# Patient Record
Sex: MALE | Race: WHITE | URBAN - METROPOLITAN AREA
[De-identification: names, ages, dates, MRNs, and addresses within clinical notes are randomized per-mention and may not be internally consistent; named-entity substitution may affect disease eponyms.]

---

## 2022-01-21 ENCOUNTER — OFFICE VISIT (OUTPATIENT)
Dept: URBAN - METROPOLITAN AREA CLINIC 98 | Facility: CLINIC | Age: 32
End: 2022-01-21

## 2022-01-31 ENCOUNTER — WEB ENCOUNTER (OUTPATIENT)
Dept: URBAN - METROPOLITAN AREA CLINIC 98 | Facility: CLINIC | Age: 32
End: 2022-01-31

## 2022-01-31 ENCOUNTER — LAB OUTSIDE AN ENCOUNTER (OUTPATIENT)
Dept: URBAN - METROPOLITAN AREA CLINIC 98 | Facility: CLINIC | Age: 32
End: 2022-01-31

## 2022-01-31 ENCOUNTER — OFFICE VISIT (OUTPATIENT)
Dept: URBAN - METROPOLITAN AREA CLINIC 98 | Facility: CLINIC | Age: 32
End: 2022-01-31

## 2022-01-31 VITALS
HEART RATE: 51 BPM | SYSTOLIC BLOOD PRESSURE: 114 MMHG | BODY MASS INDEX: 25.34 KG/M2 | DIASTOLIC BLOOD PRESSURE: 83 MMHG | WEIGHT: 167.2 LBS | HEIGHT: 68 IN | TEMPERATURE: 97.3 F

## 2022-01-31 DIAGNOSIS — K62.5 RECTAL BLEEDING: ICD-10-CM

## 2022-01-31 DIAGNOSIS — Z12.11 COLON CANCER SCREENING: ICD-10-CM

## 2022-01-31 DIAGNOSIS — R10.9 ABDOMINAL PAIN: ICD-10-CM

## 2022-01-31 DIAGNOSIS — K20.0 EOSINOPHILIC ESOPHAGITIS: ICD-10-CM

## 2022-01-31 DIAGNOSIS — R10.11 RUQ PAIN: ICD-10-CM

## 2022-01-31 PROCEDURE — G9903 PT SCRN TBCO ID AS NON USER: HCPCS | Performed by: INTERNAL MEDICINE

## 2022-01-31 PROCEDURE — G8427 DOCREV CUR MEDS BY ELIG CLIN: HCPCS | Performed by: INTERNAL MEDICINE

## 2022-01-31 PROCEDURE — 99214 OFFICE O/P EST MOD 30 MIN: CPT | Performed by: INTERNAL MEDICINE

## 2022-01-31 PROCEDURE — G8482 FLU IMMUNIZE ORDER/ADMIN: HCPCS | Performed by: INTERNAL MEDICINE

## 2022-01-31 PROCEDURE — 3017F COLORECTAL CA SCREEN DOC REV: CPT | Performed by: INTERNAL MEDICINE

## 2022-01-31 PROCEDURE — G8420 CALC BMI NORM PARAMETERS: HCPCS | Performed by: INTERNAL MEDICINE

## 2022-01-31 NOTE — HPI-TODAY'S VISIT:
f/u visit: Previously worked up with [facility].  Reviewed prior records: HIDA WNL RUQ US unremarkable 2019 EGD/Colon unremarkable 2016 EGD with esophagitis and suggested eosinophilic esophagitis Seeing blood in stools from time to time.  Mostly on TP, but sometimes in stool Known hemorrhoids and can have some rectal irritation His BMs are fairly regular Can strain with some bowel movemetns in the afternoon Stools can be hard to well formed to diarrhea Long h/o RUQ pain Intermittent pain, worse at end of day Radiates to his shoulder Has not responded to Omeprazole, and does not like the medication      PRIOR VISIT 2019: has noted BRB in stool looser stools abd pain, LUQ skin rashes, lichen planus treated for GERD in past took ulcer meds pain no better s/p EGD and colonoscopy s/p abd ultrasound

## 2022-03-27 LAB
A/G RATIO: 1.7
ALBUMIN: 4.7
ALKALINE PHOSPHATASE: 63
ALT (SGPT): 20
AST (SGOT): 19
BASO (ABSOLUTE): 0.1
BASOS: 1
BILIRUBIN, TOTAL: 0.5
BUN/CREATININE RATIO: 17
BUN: 18
C-REACTIVE PROTEIN, QUANT: <1
CALCIUM: 9.9
CARBON DIOXIDE, TOTAL: 21
CHLORIDE: 100
CREATININE: 1.09
EGFR: 93
ENDOMYSIAL ANTIBODY IGA: NEGATIVE
EOS (ABSOLUTE): 0.3
EOS: 6
GLOBULIN, TOTAL: 2.7
GLUCOSE: 113
HEMATOCRIT: 48.1
HEMATOLOGY COMMENTS:: (no result)
HEMOGLOBIN: 15.6
IMMATURE CELLS: (no result)
IMMATURE GRANS (ABS): 0
IMMATURE GRANULOCYTES: 0
IMMUNOGLOBULIN A, QN, SERUM: 269
LYMPHS (ABSOLUTE): 1.5
LYMPHS: 35
MCH: 29.5
MCHC: 32.4
MCV: 91
MONOCYTES(ABSOLUTE): 0.5
MONOCYTES: 12
NEUTROPHILS (ABSOLUTE): 2
NEUTROPHILS: 46
NRBC: (no result)
PLATELETS: 220
POTASSIUM: 4.6
PROTEIN, TOTAL: 7.4
RBC: 5.29
RDW: 12
SODIUM: 137
T-TRANSGLUTAMINASE (TTG) IGA: <2
T4,FREE(DIRECT): 1.21
TSH: 1.19
WBC: 4.3

## 2022-03-28 ENCOUNTER — OFFICE VISIT (OUTPATIENT)
Dept: URBAN - METROPOLITAN AREA CLINIC 98 | Facility: CLINIC | Age: 32
End: 2022-03-28

## 2022-03-28 ENCOUNTER — WEB ENCOUNTER (OUTPATIENT)
Dept: URBAN - METROPOLITAN AREA CLINIC 98 | Facility: CLINIC | Age: 32
End: 2022-03-28

## 2022-03-28 VITALS
BODY MASS INDEX: 25.01 KG/M2 | DIASTOLIC BLOOD PRESSURE: 84 MMHG | HEIGHT: 68 IN | SYSTOLIC BLOOD PRESSURE: 132 MMHG | WEIGHT: 165 LBS | TEMPERATURE: 96.6 F | HEART RATE: 63 BPM

## 2022-03-28 DIAGNOSIS — Z12.11 COLON CANCER SCREENING: ICD-10-CM

## 2022-03-28 DIAGNOSIS — R10.9 ABDOMINAL PAIN: ICD-10-CM

## 2022-03-28 DIAGNOSIS — K62.5 RECTAL BLEEDING: ICD-10-CM

## 2022-03-28 DIAGNOSIS — K20.0 EOSINOPHILIC ESOPHAGITIS: ICD-10-CM

## 2022-03-28 DIAGNOSIS — R10.11 RUQ PAIN: ICD-10-CM

## 2022-03-28 PROCEDURE — G8482 FLU IMMUNIZE ORDER/ADMIN: HCPCS | Performed by: INTERNAL MEDICINE

## 2022-03-28 PROCEDURE — G8420 CALC BMI NORM PARAMETERS: HCPCS | Performed by: INTERNAL MEDICINE

## 2022-03-28 PROCEDURE — 3017F COLORECTAL CA SCREEN DOC REV: CPT | Performed by: INTERNAL MEDICINE

## 2022-03-28 PROCEDURE — 99214 OFFICE O/P EST MOD 30 MIN: CPT | Performed by: INTERNAL MEDICINE

## 2022-03-28 PROCEDURE — G8427 DOCREV CUR MEDS BY ELIG CLIN: HCPCS | Performed by: INTERNAL MEDICINE

## 2022-03-28 PROCEDURE — G9903 PT SCRN TBCO ID AS NON USER: HCPCS | Performed by: INTERNAL MEDICINE

## 2022-03-28 NOTE — HPI-TODAY'S VISIT:
f/u visit: Doing fairly well Rectal bleeding/irritation has improved on fiber regimen as long as he follows his regimen . He has tried an elimination diet for possible EoE No swallowing issues Taking pepcid daily . RUQ pain improves when more regular Recent labs with normal CMP, CBC, TSH.  Celiac negative   . PRIOR VISIT: Previously worked up with [facility].  Reviewed prior records: HIDA WNL RUQ US unremarkable 2019 EGD/Colon unremarkable 2016 EGD with esophagitis and suggested eosinophilic esophagitis Seeing blood in stools from time to time.  Mostly on TP, but sometimes in stool Known hemorrhoids and can have some rectal irritation His BMs are fairly regular Can strain with some bowel movemetns in the afternoon Stools can be hard to well formed to diarrhea Long h/o RUQ pain Intermittent pain, worse at end of day Radiates to his shoulder Has not responded to Omeprazole, and does not like the medication      PRIOR VISIT 2019: has noted BRB in stool looser stools abd pain, LUQ skin rashes, lichen planus treated for GERD in past took ulcer meds pain no better s/p EGD and colonoscopy s/p abd ultrasound

## 2022-09-12 ENCOUNTER — OFFICE VISIT (OUTPATIENT)
Dept: URBAN - METROPOLITAN AREA CLINIC 98 | Facility: CLINIC | Age: 32
End: 2022-09-12

## 2022-10-28 ENCOUNTER — OFFICE VISIT (OUTPATIENT)
Dept: URBAN - METROPOLITAN AREA CLINIC 98 | Facility: CLINIC | Age: 32
End: 2022-10-28

## 2022-10-28 VITALS
SYSTOLIC BLOOD PRESSURE: 120 MMHG | DIASTOLIC BLOOD PRESSURE: 94 MMHG | HEART RATE: 67 BPM | TEMPERATURE: 97.8 F | BODY MASS INDEX: 25.19 KG/M2 | WEIGHT: 166.2 LBS | HEIGHT: 68 IN

## 2022-10-28 DIAGNOSIS — R10.11 RUQ PAIN: ICD-10-CM

## 2022-10-28 DIAGNOSIS — R10.9 ABDOMINAL PAIN: ICD-10-CM

## 2022-10-28 DIAGNOSIS — K64.9 HEMORRHOIDS: ICD-10-CM

## 2022-10-28 DIAGNOSIS — Z12.11 COLON CANCER SCREENING: ICD-10-CM

## 2022-10-28 DIAGNOSIS — K20.0 EOSINOPHILIC ESOPHAGITIS: ICD-10-CM

## 2022-10-28 DIAGNOSIS — K62.5 RECTAL BLEEDING: ICD-10-CM

## 2022-10-28 PROBLEM — 12063002: Status: ACTIVE | Noted: 2022-01-31

## 2022-10-28 PROBLEM — 70153002 HEMORRHOIDS: Status: ACTIVE | Noted: 2022-10-28

## 2022-10-28 PROBLEM — 235599003: Status: ACTIVE | Noted: 2022-01-31

## 2022-10-28 PROBLEM — 301717006: Status: ACTIVE | Noted: 2022-01-31

## 2022-10-28 PROCEDURE — G8482 FLU IMMUNIZE ORDER/ADMIN: HCPCS | Performed by: INTERNAL MEDICINE

## 2022-10-28 PROCEDURE — 3017F COLORECTAL CA SCREEN DOC REV: CPT | Performed by: INTERNAL MEDICINE

## 2022-10-28 PROCEDURE — G9903 PT SCRN TBCO ID AS NON USER: HCPCS | Performed by: INTERNAL MEDICINE

## 2022-10-28 PROCEDURE — G8427 DOCREV CUR MEDS BY ELIG CLIN: HCPCS | Performed by: INTERNAL MEDICINE

## 2022-10-28 PROCEDURE — 99213 OFFICE O/P EST LOW 20 MIN: CPT | Performed by: INTERNAL MEDICINE

## 2022-10-28 PROCEDURE — G8420 CALC BMI NORM PARAMETERS: HCPCS | Performed by: INTERNAL MEDICINE

## 2022-10-28 NOTE — HPI-TODAY'S VISIT:
f/u visit: Daughter due in next 6 weeks Last seen 3/2022.  Hemorrhoids improved, but still has some spotting on TP No frankly bloody BMs Uses Prep H and baby wipes from time to time . He has been playing with elimination diet for possible EoE.   Taking Pepcid 10mg with some reflux from time to time  . PRIOR VISIT: Doing fairly well Rectal bleeding/irritation has improved on fiber regimen as long as he follows his regimen . He has tried an elimination diet for possible EoE No swallowing issues Taking pepcid daily . RUQ pain improves when more regular Recent labs with normal CMP, CBC, TSH.  Celiac negative   . PRIOR VISIT: Previously worked up with [facility] and [facility].  Reviewed prior records: HIDA WNL RUQ US unremarkable 2019 EGD/Colon unremarkable 2016 EGD with esophagitis and suggested eosinophilic esophagitis Seeing blood in stools from time to time.  Mostly on TP, but sometimes in stool Known hemorrhoids and can have some rectal irritation His BMs are fairly regular Can strain with some bowel movemetns in the afternoon Stools can be hard to well formed to diarrhea Long h/o RUQ pain Intermittent pain, worse at end of day Radiates to his shoulder Has not responded to Omeprazole, and does not like the medication      PRIOR VISIT 2019: has noted BRB in stool looser stools abd pain, LUQ skin rashes, lichen planus treated for GERD in past took ulcer meds pain no better s/p EGD and colonoscopy s/p abd ultrasound

## 2023-06-09 ENCOUNTER — OFFICE VISIT (OUTPATIENT)
Dept: URBAN - METROPOLITAN AREA CLINIC 98 | Facility: CLINIC | Age: 33
End: 2023-06-09

## 2023-07-11 ENCOUNTER — OFFICE VISIT (OUTPATIENT)
Dept: URBAN - METROPOLITAN AREA CLINIC 98 | Facility: CLINIC | Age: 33
End: 2023-07-11

## 2023-08-21 ENCOUNTER — DASHBOARD ENCOUNTERS (OUTPATIENT)
Age: 33
End: 2023-08-21

## 2023-08-21 ENCOUNTER — OFFICE VISIT (OUTPATIENT)
Dept: URBAN - METROPOLITAN AREA CLINIC 98 | Facility: CLINIC | Age: 33
End: 2023-08-21